# Patient Record
(demographics unavailable — no encounter records)

---

## 2019-01-01 NOTE — PEDIATRIC ECHOCARDIOGRAM
Peds Echocardiography Report

 

[facility] outreach at [facility]

Referring Physician: PCP: Dr. Isaiah Robins MD: Dr Nirmal Medina



Initial study

[facility] IDX number



Indications: Cardiac murmur

Study Date: 2019

Performed by: ME



Weight 11 pounds height 20 inches



Two Dimensional Data (cm)

LV end diastolic dimension: 1.7

LV end systolic dimension: 1.2

Fractional shortenin%

LV posterior wall thickness diastolic: 0.4

Interventricular Septum diastolic thickness: 0.4

RV end diastolic dimension: 1.6

Aortic sinuses diameter: 1.0

Left atrial diameter long axis: 1.1

LV Ejection fraction (Teichholz method): 65%

Additional 2-D data: Atrial septal defect: 0.6

Patent ductus arteriosus: 0.2



Doppler Velocity Data (M/sec)

Aortic systolic: 0.8

Pulmonic systolic: 1.0

Mitral diastolic: 0.7

Tricuspid systolic: 2.0

Tricuspid diastolic: 0.5

Additional Doppler data: 

Patent ductus arteriosus velocity: 2.3 



COLOR FLOW MAPPING: shows left to right shunt at a moderate 6 mm diameter 
secundum atrial septal defect guarded by a flap on the right atrial side and 
also a shunt 2 mm diameter into the pulmonary artery through the ductus 
arteriosus.



Comments: 

Pulmonary and systemic venous returns are normal.

Atrial situs solitus with normal atrioventricular and ventriculoarterial 
relationships.

Normal dimensional data.

Normal ventricular ejection performances.

Intact ventricular septum.

Normal valvar morphology and transvalvar velocities, with a normal LV filling 
pattern.

No pathologic valvar incompetence.

The coronary arteries appear to be normal in terms of origin, distribution, and 
caliber.

Normal left sided aortic arch.

No abnormal pericardial fluid collection



Impression: left to right shunt at a moderate 6 mm diameter secundum atrial 
septal defect guarded by a flap on the right atrial side and also a shunt 2 mm 
diameter into the pulmonary artery through the ductus arteriosus.  Recommend a 
pediatric cardiology clinic visit within the next 2 to 3 weeks.

MTDD